# Patient Record
Sex: MALE | Race: WHITE
[De-identification: names, ages, dates, MRNs, and addresses within clinical notes are randomized per-mention and may not be internally consistent; named-entity substitution may affect disease eponyms.]

---

## 2020-02-18 ENCOUNTER — HOSPITAL ENCOUNTER (OUTPATIENT)
Dept: HOSPITAL 75 - RAD | Age: 25
End: 2020-02-18
Attending: NURSE PRACTITIONER
Payer: COMMERCIAL

## 2020-02-18 DIAGNOSIS — M25.821: Primary | ICD-10-CM

## 2020-02-18 PROCEDURE — 73080 X-RAY EXAM OF ELBOW: CPT

## 2020-02-18 NOTE — DIAGNOSTIC IMAGING REPORT
INDICATION: Pain.



Three views of the right elbow were obtained.



FINDINGS: The alignment is normal. There is no fracture or

dislocation. There is no joint effusion. Soft tissues are

unremarkable.



IMPRESSION: No acute radiographic abnormality.



Dictated by: 



  Dictated on workstation # [id number]